# Patient Record
(demographics unavailable — no encounter records)

---

## 2024-10-09 NOTE — HISTORY OF PRESENT ILLNESS
[de-identified] : Agapito Kumar 81F, no AC/AP mechanical fall in ER 9/27/2024. CT w small L frontal tSAH, 1 cm circular R frontal convexity hyperdensity, likely mening, stable c/t 2017. No ha/n/v.  No seizures. -currently following neurology dr nunes per patient -has plastics appointment for left superior facial

## 2024-10-09 NOTE — ASSESSMENT
[FreeTextEntry1] : Agapito Kumar 81F, no AC/AP mechanical fall in ER 9/27/2024. CT w small L frontal tSAH, 1 cm circular R frontal convexity hyperdensity, likely mening, stable c/t 2017. No ha/n/v.  No seizures. -currently following neurology dr nunes per patient -has plastics appointment for left superior facial

## 2024-10-09 NOTE — PHYSICAL EXAM
[General Appearance - Alert] : alert [General Appearance - In No Acute Distress] : in no acute distress [Oriented To Time, Place, And Person] : oriented to person, place, and time [Impaired Insight] : insight and judgment were intact [Affect] : the affect was normal [Person] : oriented to person [Place] : oriented to place [Time] : oriented to time [Short Term Intact] : short term memory intact [Remote Intact] : remote memory intact [Span Intact] : the attention span was normal [Concentration Intact] : normal concentrating ability [Fluency] : fluency intact [Comprehension] : comprehension intact [Current Events] : adequate knowledge of current events [Past History] : adequate knowledge of personal past history [Vocabulary] : adequate range of vocabulary [Cranial Nerves Optic (II)] : visual acuity intact bilaterally,  pupils equal round and reactive to light [Cranial Nerves Oculomotor (III)] : extraocular motion intact [Cranial Nerves Trigeminal (V)] : facial sensation intact symmetrically [Cranial Nerves Vestibulocochlear (VIII)] : hearing was intact bilaterally [Cranial Nerves Glossopharyngeal (IX)] : tongue and palate midline [Cranial Nerves Accessory (XI - Cranial And Spinal)] : head turning and shoulder shrug symmetric [Cranial Nerves Hypoglossal (XII)] : there was no tongue deviation with protrusion [Motor Tone] : muscle tone was normal in all four extremities [Motor Strength] : muscle strength was normal in all four extremities [No Muscle Atrophy] : normal bulk in all four extremities [Sensation Tactile Decrease] : light touch was intact [Sclera] : the sclera and conjunctiva were normal [PERRL With Normal Accommodation] : pupils were equal in size, round, reactive to light, with normal accommodation [Extraocular Movements] : extraocular movements were intact [Over the Past 2 Weeks, Have You Felt Down, Depressed, or Hopeless?] : 1.) Over the past 2 weeks, have you felt down, depressed, or hopeless? No [Over the Past 2 Weeks, Have You Felt Little Interest or Pleasure Doing Things?] : 2.) Over the past 2 weeks, have you felt little interest or pleasure doing things? No [FreeTextEntry1] : -depression screening/MDD completed; PH9 score  < 5, negative MDD [FreeTextEntry5] : left superior facial; bruising

## 2024-10-30 NOTE — ASSESSMENT
[FreeTextEntry1] : This is an 81-year-old female with fibromyalgia and irritable bowel syndrome.  She also suffers from chronic GERD and history of peptic ulcer disease.  For the GERD, she is to continue on lansoprazole 15 mg daily.  I had advised her to try famotidine 20 mg at bedtime.  She wants to hold off until her wrist has healed.  She states that she will call in a couple of weeks when she can try the famotidine.  For the constipation, she is to increase her fiber and water intake.  In the past her constipation has improved by taking fiber supplements.  I spent approximately 30 minutes with her going over her history, answering questions, and discussing management of her GERD and IBS.

## 2024-10-30 NOTE — HISTORY OF PRESENT ILLNESS
[FreeTextEntry1] : Agapito for follow-up visit.  She relates that approximately 5 weeks ago she had an accidental fall, hitting her head on the floor, needing stitches on her left upper eyelid, small intracranial bleed, and breaking her wrist.  Because of this accident she has been under tremendous amount of stress.  She noticed that she was having upper abdominal pains, cramping, followed by worsening constipation.  She would have small pellet sized stools.  She has since gotten better with improvement in her stress.  The abdominal pain has disappeared and she is having good bowel movements.  The whole time she was having this issue there was no associated rectal bleeding or melena.  No nausea or vomiting.  No fevers or chills.  Review of records reveal a CT of the chest abdomen pelvis on September 27, 2024 with no obvious GI pathology.  I reviewed the results of the CAT scan of the abdomen pelvis with her.  She relates that she had recent blood work done by her rheumatologist at [facility] which came back normal.  She will send copies of the report.  During the time that she was having upper abdominal pain should increase her lansoprazole from 15 mg to 30 mg for 3 days for which she states helped her.  She is now back down to lansoprazole 15 mg daily.  She did not give a trial of the famotidine as had recommended in the past.

## 2024-11-06 NOTE — PHYSICAL EXAM
[No Acute Distress] : no acute distress [Well Nourished] : well nourished [Well Developed] : well developed [Well-Appearing] : well-appearing [Normal Voice/Communication] : normal voice/communication [Normal Sclera/Conjunctiva] : normal sclera/conjunctiva [PERRL] : pupils equal round and reactive to light [EOMI] : extraocular movements intact [Normal Outer Ear/Nose] : the outer ears and nose were normal in appearance [Normal Oropharynx] : the oropharynx was normal [Normal TMs] : both tympanic membranes were normal [No JVD] : no jugular venous distention [Supple] : supple [No Respiratory Distress] : no respiratory distress  [No Accessory Muscle Use] : no accessory muscle use [Clear to Auscultation] : lungs were clear to auscultation bilaterally [Normal Rate] : normal rate  [Regular Rhythm] : with a regular rhythm [Normal S1, S2] : normal S1 and S2 [No Carotid Bruits] : no carotid bruits [No Edema] : there was no peripheral edema [No Extremity Clubbing/Cyanosis] : no extremity clubbing/cyanosis [Declined Breast Exam] : declined breast exam  [Soft] : abdomen soft [Normal Bowel Sounds] : normal bowel sounds [Declined Rectal Exam] : declined rectal exam [No CVA Tenderness] : no CVA  tenderness [No Spinal Tenderness] : no spinal tenderness [No Focal Deficits] : no focal deficits [Normal Gait] : normal gait [Speech Grossly Normal] : speech grossly normal [Alert and Oriented x3] : oriented to person, place, and time [de-identified] : Wearing glasses [de-identified] : no ST [de-identified] : no stridor [de-identified] : R=16; no wheezing; good air entry [de-identified] : no cords [de-identified] : per GYN [de-identified] : Bilateral lower extremity vasculitic rash [de-identified] : Anxious

## 2024-11-06 NOTE — REVIEW OF SYSTEMS
[Redness] : redness [Dryness] : dryness  [Vision Problems] : vision problems [Itching] : itching [Hearing Loss] : hearing loss [Postnasal Drip] : postnasal drip [Dyspnea on Exertion] : dyspnea on exertion [Heartburn] : heartburn [Joint Pain] : joint pain [Back Pain] : back pain [Itching] : Itching [Nail Changes] : nail changes [Hair Changes] : hair changes [Skin Rash] : skin rash [Anxiety] : anxiety [Depression] : depression [Negative] : Heme/Lymph

## 2024-11-06 NOTE — PHYSICAL EXAM
[No Acute Distress] : no acute distress [Well Nourished] : well nourished [Well Developed] : well developed [Well-Appearing] : well-appearing [Normal Voice/Communication] : normal voice/communication [Normal Sclera/Conjunctiva] : normal sclera/conjunctiva [PERRL] : pupils equal round and reactive to light [EOMI] : extraocular movements intact [Normal Outer Ear/Nose] : the outer ears and nose were normal in appearance [Normal Oropharynx] : the oropharynx was normal [Normal TMs] : both tympanic membranes were normal [No JVD] : no jugular venous distention [Supple] : supple [No Respiratory Distress] : no respiratory distress  [No Accessory Muscle Use] : no accessory muscle use [Clear to Auscultation] : lungs were clear to auscultation bilaterally [Normal Rate] : normal rate  [Regular Rhythm] : with a regular rhythm [Normal S1, S2] : normal S1 and S2 [No Carotid Bruits] : no carotid bruits [No Edema] : there was no peripheral edema [No Extremity Clubbing/Cyanosis] : no extremity clubbing/cyanosis [Declined Breast Exam] : declined breast exam  [Soft] : abdomen soft [Normal Bowel Sounds] : normal bowel sounds [Declined Rectal Exam] : declined rectal exam [No CVA Tenderness] : no CVA  tenderness [No Spinal Tenderness] : no spinal tenderness [No Focal Deficits] : no focal deficits [Normal Gait] : normal gait [Speech Grossly Normal] : speech grossly normal [Alert and Oriented x3] : oriented to person, place, and time [de-identified] : Wearing glasses [de-identified] : no ST [de-identified] : no stridor [de-identified] : R=16; no wheezing; good air entry [de-identified] : no cords [de-identified] : per GYN [de-identified] : Bilateral lower extremity vasculitic rash [de-identified] : Anxious

## 2024-11-06 NOTE — PHYSICAL EXAM
[No Acute Distress] : no acute distress [Well Nourished] : well nourished [Well Developed] : well developed [Well-Appearing] : well-appearing [Normal Voice/Communication] : normal voice/communication [Normal Sclera/Conjunctiva] : normal sclera/conjunctiva [PERRL] : pupils equal round and reactive to light [EOMI] : extraocular movements intact [Normal Outer Ear/Nose] : the outer ears and nose were normal in appearance [Normal Oropharynx] : the oropharynx was normal [Normal TMs] : both tympanic membranes were normal [No JVD] : no jugular venous distention [Supple] : supple [No Respiratory Distress] : no respiratory distress  [No Accessory Muscle Use] : no accessory muscle use [Clear to Auscultation] : lungs were clear to auscultation bilaterally [Normal Rate] : normal rate  [Regular Rhythm] : with a regular rhythm [Normal S1, S2] : normal S1 and S2 [No Carotid Bruits] : no carotid bruits [No Edema] : there was no peripheral edema [No Extremity Clubbing/Cyanosis] : no extremity clubbing/cyanosis [Declined Breast Exam] : declined breast exam  [Soft] : abdomen soft [Normal Bowel Sounds] : normal bowel sounds [Declined Rectal Exam] : declined rectal exam [No CVA Tenderness] : no CVA  tenderness [No Spinal Tenderness] : no spinal tenderness [No Focal Deficits] : no focal deficits [Normal Gait] : normal gait [Speech Grossly Normal] : speech grossly normal [Alert and Oriented x3] : oriented to person, place, and time [de-identified] : Wearing glasses [de-identified] : no ST [de-identified] : no stridor [de-identified] : R=16; no wheezing; good air entry [de-identified] : no cords [de-identified] : per GYN [de-identified] : Bilateral lower extremity vasculitic rash [de-identified] : Anxious

## 2024-11-06 NOTE — HISTORY OF PRESENT ILLNESS
[FreeTextEntry8] : Comes in to update me on her recent health issues.  Also has list of questions which she would like to review and discuss with me. Reports that her left eye wound is healing well.  The sutures have been removed.  Edema and redness have diminished.  Her only concern is that she has difficulty raising her left eyebrow. Had recent labs with rheumatology copy brought in for scanning.  Wants me to review with her. Wants to review status of her vaccines to determine what vaccines are necessary. Wants a prescription for physical therapy for her cervical and lumbar spine issues. Remains under the care of neurology. Recently saw cardiology.  Upset that follow-up echo was not done.  Plans to address with cardiology at her next visit there. Found an article online which linked amlodipine with vasculitis.  Both cardiology and rheumatology have advised her to hold her amlodipine for 2 to 4 weeks to see if her vasculitic rash improves.  She is reluctant to do so presently.  Her rash tends to fluctuate.  Remains concerned that her Sjogren's can lead to lymphoma.

## 2024-11-06 NOTE — ASSESSMENT
[FreeTextEntry1] : Laceration above left eye Sutures removed Healing well with reduced edema and erythema Plastic surgery follow-up Planning to see Dr. Nevarez regarding inability to raise left eyebrow  Sjogren's syndrome Has vasculitic dermatitis Under the care of Dr. Dubose = rheumatology Recent labs reviewed with patient and scanned Continue topical therapy Dermatology follow-up  History of meningioma History of TIA Recent fall with subarachnoid hemorrhage Under the care of neurology Continue brain imaging Remains asymptomatic Fall precautions/balance training Prescriptions for physical therapy for cervical and lumbar spine disc disease given as requested  Hypertension Blood pressure in good range today Weight control and low-sodium diet On daily amlodipine Rheumatology and cardiology have advised her to hold amlodipine for 2 to 4 weeks to determine if there is improvement in her rash = patient presently declining Cardiology follow-up/echo  Asthma Clinically stable Monitor chest x-ray and PFTs Vaccines reviewed Will consider influenza, COVID, Prevnar 20 vaccination Has had recent Tdap and RSV vaccines///also considering Shingrix vaccination Consider [place] Xopenex nebs as needed  She wishes to return in follow-up at her next scheduled visit in December 2024 and as needed She will call if her status changes or worsens or for any medical/pulmonary issues and return to be seen immediately All of the above was discussed in detail with the patient Her list of questions was addressed and answered Written directions were given She verbally confirmed understanding of all of the above and agreement with the above plan

## 2024-11-06 NOTE — HEALTH RISK ASSESSMENT
[Intercurrent ED visits] : went to ED [Intercurrent hospitalizations] : was admitted to the hospital  [No] : In the past 12 months have you used drugs other than those required for medical reasons? No [Any fall with injury in past year] : Patient reported fall with injury in the past year [Patient refused screening] : Patient refused screening [Former] : Former [de-identified] : Cardiology, neurology, neurosurgery, plastic surgery, GI [de-identified] : None presently [de-identified] : Regular <<-----Click on this checkbox to enter Procedure Circumcision  2018    Active  KRISTI

## 2024-11-06 NOTE — ASSESSMENT
[FreeTextEntry1] : Laceration above left eye Sutures removed Healing well with reduced edema and erythema Plastic surgery follow-up Planning to see Dr. Nevarez regarding inability to raise left eyebrow  Sjogren's syndrome Has vasculitic dermatitis Under the care of Dr. Dubose = rheumatology Recent labs reviewed with patient and scanned Continue topical therapy Dermatology follow-up  History of meningioma History of TIA Recent fall with subarachnoid hemorrhage Under the care of neurology Continue brain imaging Remains asymptomatic Fall precautions/balance training Prescriptions for physical therapy for cervical and lumbar spine disc disease given as requested  Hypertension Blood pressure in good range today Weight control and low-sodium diet On daily amlodipine Rheumatology and cardiology have advised her to hold amlodipine for 2 to 4 weeks to determine if there is improvement in her rash = patient presently declining Cardiology follow-up/echo  Asthma Clinically stable Monitor chest x-ray and PFTs Vaccines reviewed Will consider influenza, COVID, Prevnar 20 vaccination Has had recent Tdap and RSV vaccines///also considering Shingrix vaccination Consider [place] Xopenex nebs as needed  She wishes to return in follow-up at her next scheduled visit in December 2024 and as needed She will call if her status changes or worsens or for any medical/pulmonary issues and return to be seen immediately All of the above was discussed in detail with the patient Her list of questions was addressed and answered Written directions were given She verbally confirmed understanding of all of the above and agreement with the above plan

## 2024-11-06 NOTE — ASSESSMENT
[FreeTextEntry1] : Laceration above left eye Sutures removed Healing well with reduced edema and erythema Plastic surgery follow-up Planning to see Dr. Nevarez regarding inability to raise left eyebrow  Sjogren's syndrome Has vasculitic dermatitis Under the care of Dr. Duboes = rheumatology Recent labs reviewed with patient and scanned Continue topical therapy Dermatology follow-up  History of meningioma History of TIA Recent fall with subarachnoid hemorrhage Under the care of neurology Continue brain imaging Remains asymptomatic Fall precautions/balance training Prescriptions for physical therapy for cervical and lumbar spine disc disease given as requested  Hypertension Blood pressure in good range today Weight control and low-sodium diet On daily amlodipine Rheumatology and cardiology have advised her to hold amlodipine for 2 to 4 weeks to determine if there is improvement in her rash = patient presently declining Cardiology follow-up/echo  Asthma Clinically stable Monitor chest x-ray and PFTs Vaccines reviewed Will consider influenza, COVID, Prevnar 20 vaccination Has had recent Tdap and RSV vaccines///also considering Shingrix vaccination Consider [place] Xopenex nebs as needed  She wishes to return in follow-up at her next scheduled visit in December 2024 and as needed She will call if her status changes or worsens or for any medical/pulmonary issues and return to be seen immediately All of the above was discussed in detail with the patient Her list of questions was addressed and answered Written directions were given She verbally confirmed understanding of all of the above and agreement with the above plan

## 2024-11-06 NOTE — HEALTH RISK ASSESSMENT
[Intercurrent ED visits] : went to ED [Intercurrent hospitalizations] : was admitted to the hospital  [No] : In the past 12 months have you used drugs other than those required for medical reasons? No [Any fall with injury in past year] : Patient reported fall with injury in the past year [Patient refused screening] : Patient refused screening [Former] : Former [de-identified] : Cardiology, neurology, neurosurgery, plastic surgery, GI [de-identified] : None presently [de-identified] : Regular

## 2024-11-06 NOTE — HEALTH RISK ASSESSMENT
[Intercurrent ED visits] : went to ED [Intercurrent hospitalizations] : was admitted to the hospital  [No] : In the past 12 months have you used drugs other than those required for medical reasons? No [Any fall with injury in past year] : Patient reported fall with injury in the past year [Patient refused screening] : Patient refused screening [Former] : Former [de-identified] : Cardiology, neurology, neurosurgery, plastic surgery, GI [de-identified] : None presently [de-identified] : Regular

## 2024-12-30 NOTE — PHYSICAL EXAM
[No Acute Distress] : no acute distress [Well Nourished] : well nourished [Well Developed] : well developed [Well-Appearing] : well-appearing [Normal Voice/Communication] : normal voice/communication [Normal Sclera/Conjunctiva] : normal sclera/conjunctiva [PERRL] : pupils equal round and reactive to light [EOMI] : extraocular movements intact [Normal Outer Ear/Nose] : the outer ears and nose were normal in appearance [Normal Oropharynx] : the oropharynx was normal [Normal TMs] : both tympanic membranes were normal [No JVD] : no jugular venous distention [Supple] : supple [No Respiratory Distress] : no respiratory distress  [No Accessory Muscle Use] : no accessory muscle use [Clear to Auscultation] : lungs were clear to auscultation bilaterally [Normal Rate] : normal rate  [Regular Rhythm] : with a regular rhythm [Normal S1, S2] : normal S1 and S2 [No Carotid Bruits] : no carotid bruits [No Edema] : there was no peripheral edema [No Extremity Clubbing/Cyanosis] : no extremity clubbing/cyanosis [Declined Breast Exam] : declined breast exam  [Soft] : abdomen soft [Normal Bowel Sounds] : normal bowel sounds [Declined Rectal Exam] : declined rectal exam [No CVA Tenderness] : no CVA  tenderness [No Spinal Tenderness] : no spinal tenderness [No Focal Deficits] : no focal deficits [Normal Gait] : normal gait [Speech Grossly Normal] : speech grossly normal [Alert and Oriented x3] : oriented to person, place, and time [de-identified] : Wearing glasses [de-identified] : no ST [de-identified] : no stridor [de-identified] : R=16; no wheezing; good air entry [de-identified] : no cords [de-identified] : per GYN [de-identified] : Bilateral lower extremity vasculitic rash [de-identified] : Anxious

## 2024-12-30 NOTE — HEALTH RISK ASSESSMENT
[No] : In the past 12 months have you used drugs other than those required for medical reasons? No [No falls in past year] : Patient reported no falls in the past year [Patient refused screening] : Patient refused screening [de-identified] : Exercises [de-identified] : Regular [Former] : Former

## 2024-12-30 NOTE — ASSESSMENT
[FreeTextEntry1] : Acute URI symptoms with scratchy throat, nasal congestion, wheezing, cough RVP sent today Rest/fluids Monitor fever curve Mucinex DM if needed for cough relief Gargles/lozenges/warm fluids Warm compresses to sinuses Steam therapy/humidifier Saline rinses Flonase/Atrovent nasal spray as needed  Sjogren's syndrome Has vasculitic dermatitis Under the care of Dr. Dubose = rheumatology Continue topical therapy Dermatology follow-up  History of meningioma History of TIA Recent fall with subarachnoid hemorrhage Under the care of neurology Continue brain imaging Remains asymptomatic Fall precautions/balance training  Hypertension Blood pressure in good range today Weight control and low-sodium diet On daily amlodipine Rheumatology and cardiology have advised her to hold amlodipine for 2 to 4 weeks to determine if there is improvement in her vasculitic rash = patient presently declining but will consider upon return to [place] from [place] Cardiology follow-up Prescription for repeat echo given as requested so as to monitor pulmonary hypertension  Asthma Clinically stable Monitor chest x-ray She declined PFTs today/states she will do it next visit Vaccines reviewed Will consider influenza, COVID, Prevnar 20 vaccination Has had recent Tdap and RSV vaccines///also considering Shingrix vaccination Consider [place] Xopenex nebs as needed As needed prescriptions for Medrol, Zithromax, rescue inhaler given for travel as requested  GERD GI follow-up Remains on famotidine Trying to wean off PPI therapy as per GI Antireflux diet and precautions  Osteoporosis Monitor bone density testing Planning follow-up with endocrine and rheumatology for further evaluation and management Weightbearing exercise/vitamin D supplementation/calcium rich diet/supplementation  She wishes to return in follow-up for her annual physical and as needed She will call if her status changes or worsens or for any medical/pulmonary issues and return to be seen immediately All of the above was discussed in detail with the patient Her list of questions was addressed and answered She verbally confirmed understanding of all of the above and agreement with the above plan

## 2024-12-30 NOTE — ASSESSMENT
[FreeTextEntry1] : Acute URI symptoms with scratchy throat, nasal congestion, wheezing, cough RVP sent today Rest/fluids Monitor fever curve Mucinex DM if needed for cough relief Gargles/lozenges/warm fluids Warm compresses to sinuses Steam therapy/humidifier Saline rinses Flonase/Atrovent nasal spray as needed  Sjogren's syndrome Has vasculitic dermatitis Under the care of Dr. Dubose = rheumatology Continue topical therapy Dermatology follow-up  History of meningioma History of TIA Recent fall with subarachnoid hemorrhage Under the care of neurology Continue brain imaging Remains asymptomatic Fall precautions/balance training  Hypertension Blood pressure in good range today Weight control and low-sodium diet On daily amlodipine Rheumatology and cardiology have advised her to hold amlodipine for 2 to 4 weeks to determine if there is improvement in her vasculitic rash = patient presently declining but will consider upon return to [place] from [place] Cardiology follow-up Prescription for repeat echo given as requested so as to monitor pulmonary hypertension  Asthma Clinically stable Monitor chest x-ray She declined PFTs today/states she will do it next visit Vaccines reviewed Will consider influenza, COVID, Prevnar 20 vaccination Has had recent Tdap and RSV vaccines///also considering Shingrix vaccination Consider [place] Xopenex nebs as needed As needed prescriptions for Medrol, Zithromax, rescue inhaler given for travel as requested  GERD GI follow-up Remains on famotidine Trying to wean off PPI therapy as per GI Antireflux diet and precautions  Osteoporosis Monitor bone density testing Planning follow-up with endocrine and rheumatology for further evaluation and management Weightbearing exercise/vitamin D supplementation/calcium rich diet/supplementation  She wishes to return in follow-up for her annual physical and as needed She will call if her status changes or worsens or for any medical/pulmonary issues and return to be seen immediately All of the above was discussed in detail with the patient Her list of questions was addressed and answered She verbally confirmed understanding of all of the above and agreement with the above plan

## 2024-12-30 NOTE — HEALTH RISK ASSESSMENT
[No] : In the past 12 months have you used drugs other than those required for medical reasons? No [No falls in past year] : Patient reported no falls in the past year [Patient refused screening] : Patient refused screening [de-identified] : Exercises [de-identified] : Regular [Former] : Former

## 2024-12-30 NOTE — PHYSICAL EXAM
[No Acute Distress] : no acute distress [Well Nourished] : well nourished [Well Developed] : well developed [Well-Appearing] : well-appearing [Normal Voice/Communication] : normal voice/communication [Normal Sclera/Conjunctiva] : normal sclera/conjunctiva [PERRL] : pupils equal round and reactive to light [EOMI] : extraocular movements intact [Normal Outer Ear/Nose] : the outer ears and nose were normal in appearance [Normal Oropharynx] : the oropharynx was normal [Normal TMs] : both tympanic membranes were normal [No JVD] : no jugular venous distention [Supple] : supple [No Respiratory Distress] : no respiratory distress  [No Accessory Muscle Use] : no accessory muscle use [Clear to Auscultation] : lungs were clear to auscultation bilaterally [Normal Rate] : normal rate  [Regular Rhythm] : with a regular rhythm [Normal S1, S2] : normal S1 and S2 [No Carotid Bruits] : no carotid bruits [No Edema] : there was no peripheral edema [No Extremity Clubbing/Cyanosis] : no extremity clubbing/cyanosis [Declined Breast Exam] : declined breast exam  [Soft] : abdomen soft [Normal Bowel Sounds] : normal bowel sounds [Declined Rectal Exam] : declined rectal exam [No CVA Tenderness] : no CVA  tenderness [No Spinal Tenderness] : no spinal tenderness [No Focal Deficits] : no focal deficits [Normal Gait] : normal gait [Speech Grossly Normal] : speech grossly normal [Alert and Oriented x3] : oriented to person, place, and time [de-identified] : Wearing glasses [de-identified] : no ST [de-identified] : no stridor [de-identified] : R=16; no wheezing; good air entry [de-identified] : no cords [de-identified] : per GYN [de-identified] : Bilateral lower extremity vasculitic rash [de-identified] : Anxious

## 2024-12-30 NOTE — HISTORY OF PRESENT ILLNESS
[FreeTextEntry8] : Comes in for acute medical visit.  Hoping to go to [place] next week.  Comes in today in preparation for that trip.  Reports that her fibromyalgia has been active.  Continues to have issues with mouth dryness.  Also suffering with active vasculitis.  Topical therapy only somewhat helpful in reducing her symptomatology.  Considering a discontinuation of amlodipine upon her return to [place] from [place] to determine if this medication is the source of her vasculitis.  Under the care of GI for GERD.  Would like to come off of PPI therapy and remain on famotidine therapy alone.  May be sick.  Has scratchy throat.  Has nasal congestion and rhinitis.  Has noticed some chest congestion and wheezing.  Denies any chest pain.  Denies any significant cough.  Denies fever or hemoptysis.  Has not done any COVID or viral testing.  She reports that cardiology has not ordered follow-up echo for her PAH.  Requesting that I place prescription in system for future testing.  Planning to see endocrine regarding management of osteoporosis upon return from [place].

## 2025-01-30 NOTE — PHYSICAL EXAM
[Well Nourished] : well nourished [Well Developed] : well developed [Normal] : no jugular venous distention, supple, no lymphadenopathy and the thyroid was normal and there were no nodules present [No Respiratory Distress] : no respiratory distress  [No Accessory Muscle Use] : no accessory muscle use [de-identified] : mild dyspnea  [de-identified] : mild scattereed wheeze

## 2025-01-30 NOTE — REVIEW OF SYSTEMS
[Fever] : no fever [Chills] : no chills [Fatigue] : fatigue [Hot Flashes] : no hot flashes [Night Sweats] : no night sweats [Recent Change In Weight] : ~T no recent weight change [Shortness Of Breath] : shortness of breath [Wheezing] : wheezing [Cough] : cough [Dyspnea on Exertion] : dyspnea on exertion [Joint Pain] : no joint pain [Muscle Pain] : no muscle pain [Negative] : Gastrointestinal

## 2025-01-30 NOTE — HISTORY OF PRESENT ILLNESS
[FreeTextEntry8] : 81 yr old F patient of Dr Quintana here for an acute visit. She has been diagnosed with covid, symptoms starting 8 days ago. Started feeling better and now is having shortness of breath.  Is taking robitussin for her cough.  She has history of sjogrens, pulm htn and asthma for which she occasionally takes zopenex, she did not take it this morning.  Had prevnar 20 and flu vaccines about 1 month ago.  RSV vaccine 2 years go.  No fever, body aches.   currently with flu, diagnosed the same day she was diagnosed with covid.  Just returned from [place].

## 2025-02-05 NOTE — HISTORY OF PRESENT ILLNESS
[FreeTextEntry8] : Comes in for acute visit. Recently tested positive for COVID-19.  Exposed to her spouse who had influenza.  Patient then tested positive for influenza.  Has been using lev albuterol via nebulizer 3 times daily.  Recently was seen here in the office and advised to use Medrol pack which she recently completed.  Slowly recovering.  Still has multiple complaints.  Feels weak and dizzy.  Fatigue slightly improved.  Denies any nausea/vomiting/diarrhea.  Appetite reduced.  Denies any fevers or chills.  Denies any chest pain.  Cough has improved.  Breathing more comfortably.  Occasional wheezing.  Denies any hemoptysis.  Denies any ear pain.  Complains of severe dryness of her airways.  Feels that her Sjogren's has been exacerbated.  Extremely anxious over her health situation.  Also appears concerned about her living situation and poor finances. [Spouse] : spouse

## 2025-02-05 NOTE — HEALTH RISK ASSESSMENT
[Intercurrent Urgi Care visits] : went to urgent care [No] : In the past 12 months have you used drugs other than those required for medical reasons? No [Any fall with injury in past year] : Patient reported fall with injury in the past year [Patient refused screening] : Patient refused screening [de-identified] : Dr. Fairbanks [de-identified] : None presently [de-identified] : Regular [Former] : Former

## 2025-02-05 NOTE — ASSESSMENT
[FreeTextEntry1] : Recent acute COVID-19 and influenza Likely triggering acute asthma exacerbation Slowly clinically improving Reassured that many of her symptoms are related to her acute viral illnesses and may take weeks to resolve Her questions regarding vaccination, retesting, and quarantine were again reviewed and answered Rest Activity as tolerated Keep well-hydrated Monitor fever curve Use Tylenol if needed to control fever and pain Continue cough medication Continue lev albuterol via nebulizer every 4-6 hours as needed Hold on repeat Medrol pack at this time Gargles/lozenges/warm fluids for any sore throat symptoms Apply warm compresses to sinuses Steam therapy/humidifier Saline nasal rinses Flonase as needed  Sjogren syndrome Complains of increased dryness of airways Suspect exacerbation triggered by viral processes Can discuss with rheumatology and ENT Humidifier Saline nasal rinses and gel Minimize antihistamine use Minimize Flonase use Keep well-hydrated Emollients Will check labs below today  Hypertension Blood pressure in good range today Continue amlodipine Low-sodium diet Cardiology follow-up  GERD Maintain antireflux diet and precautions Remains on PPI therapy Planning switch to famotidine as per GI Gaviscon as needed GI follow-up with Dr. Park  Osteoporosis Daily weightbearing exercise Vitamin D supplementation = monitor level Calcium rich diet/supplementation Monitor bone density Planning endocrine follow-up for management/treatment  She will return for her annual physical with PFTs in June 2025 and as needed She will call if her status does not improve or worsens/changes and return to be seen immediately She will call for any medical or pulmonary issues and return to be seen immediately All of the above was discussed in detail with the patient and her  All of her questions were addressed and answered Reassurance was provided She verbally confirmed understanding of all of the above and agreement with the above plan

## 2025-02-05 NOTE — PHYSICAL EXAM
[No Acute Distress] : no acute distress [Well Nourished] : well nourished [Well Developed] : well developed [Well-Appearing] : well-appearing [Normal Voice/Communication] : normal voice/communication [Normal Sclera/Conjunctiva] : normal sclera/conjunctiva [PERRL] : pupils equal round and reactive to light [EOMI] : extraocular movements intact [Normal Outer Ear/Nose] : the outer ears and nose were normal in appearance [Normal Oropharynx] : the oropharynx was normal [Normal TMs] : both tympanic membranes were normal [No JVD] : no jugular venous distention [Supple] : supple [No Respiratory Distress] : no respiratory distress  [No Accessory Muscle Use] : no accessory muscle use [Clear to Auscultation] : lungs were clear to auscultation bilaterally [Normal Rate] : normal rate  [Regular Rhythm] : with a regular rhythm [Normal S1, S2] : normal S1 and S2 [No Carotid Bruits] : no carotid bruits [No Edema] : there was no peripheral edema [No Extremity Clubbing/Cyanosis] : no extremity clubbing/cyanosis [Declined Breast Exam] : declined breast exam  [Soft] : abdomen soft [Normal Bowel Sounds] : normal bowel sounds [Declined Rectal Exam] : declined rectal exam [No CVA Tenderness] : no CVA  tenderness [No Spinal Tenderness] : no spinal tenderness [No Focal Deficits] : no focal deficits [Normal Gait] : normal gait [Speech Grossly Normal] : speech grossly normal [Alert and Oriented x3] : oriented to person, place, and time [de-identified] : Wearing glasses [de-identified] : no ST [de-identified] : no stridor [de-identified] : R=16; no wheezing; good air entry [de-identified] : no cords [de-identified] : per GYN [de-identified] : Bilateral lower extremity vasculitic rash [de-identified] : Anxious

## 2025-02-05 NOTE — REVIEW OF SYSTEMS
[Fatigue] : fatigue [Recent Change In Weight] : ~T recent weight change [Redness] : redness [Dryness] : dryness  [Vision Problems] : vision problems [Itching] : itching [Hearing Loss] : hearing loss [Postnasal Drip] : postnasal drip [Dyspnea on Exertion] : dyspnea on exertion [Heartburn] : heartburn [Joint Pain] : joint pain [Back Pain] : back pain [Itching] : Itching [Nail Changes] : nail changes [Hair Changes] : hair changes [Skin Rash] : skin rash [Dizziness] : dizziness [Anxiety] : anxiety [Depression] : depression [Negative] : Heme/Lymph

## 2025-02-05 NOTE — HEALTH RISK ASSESSMENT
[Intercurrent Urgi Care visits] : went to urgent care [No] : In the past 12 months have you used drugs other than those required for medical reasons? No [Any fall with injury in past year] : Patient reported fall with injury in the past year [Patient refused screening] : Patient refused screening [de-identified] : Dr. Fairbanks [de-identified] : None presently [de-identified] : Regular [Former] : Former

## 2025-02-05 NOTE — PHYSICAL EXAM
[No Acute Distress] : no acute distress [Well Nourished] : well nourished [Well Developed] : well developed [Well-Appearing] : well-appearing [Normal Voice/Communication] : normal voice/communication [Normal Sclera/Conjunctiva] : normal sclera/conjunctiva [PERRL] : pupils equal round and reactive to light [EOMI] : extraocular movements intact [Normal Outer Ear/Nose] : the outer ears and nose were normal in appearance [Normal Oropharynx] : the oropharynx was normal [Normal TMs] : both tympanic membranes were normal [No JVD] : no jugular venous distention [Supple] : supple [No Respiratory Distress] : no respiratory distress  [No Accessory Muscle Use] : no accessory muscle use [Clear to Auscultation] : lungs were clear to auscultation bilaterally [Normal Rate] : normal rate  [Regular Rhythm] : with a regular rhythm [Normal S1, S2] : normal S1 and S2 [No Carotid Bruits] : no carotid bruits [No Edema] : there was no peripheral edema [No Extremity Clubbing/Cyanosis] : no extremity clubbing/cyanosis [Declined Breast Exam] : declined breast exam  [Soft] : abdomen soft [Normal Bowel Sounds] : normal bowel sounds [Declined Rectal Exam] : declined rectal exam [No CVA Tenderness] : no CVA  tenderness [No Spinal Tenderness] : no spinal tenderness [No Focal Deficits] : no focal deficits [Normal Gait] : normal gait [Speech Grossly Normal] : speech grossly normal [Alert and Oriented x3] : oriented to person, place, and time [de-identified] : Wearing glasses [de-identified] : no ST [de-identified] : no stridor [de-identified] : R=16; no wheezing; good air entry [de-identified] : no cords [de-identified] : per GYN [de-identified] : Bilateral lower extremity vasculitic rash [de-identified] : Anxious

## 2025-04-01 NOTE — REVIEW OF SYSTEMS
[Feeling Fatigued] : feeling fatigued [Abdominal Pain] : abdominal pain [Blood in Stool] : blood in stool [Joint Pain] : joint pain [Rash] : rash [Memory Lapses Or Loss] : memory lapses or loss [Under Stress] : under stress [Negative] : Genitourinary

## 2025-04-01 NOTE — DISCUSSION/SUMMARY
[FreeTextEntry1] : Ms. Kumar has no new complaints and looks unchanged.  Her exam shows regular rhythm, normal blood pressure, no change in her weight over the past year.  Clear lungs, and a normal cardiac exam.  An EKG shows left atrial enlargement which has been seen in the past and was otherwise normal.  I am a little concerned about her increasing estimated PAS.  This is most likely due to HFpEF, but she has a history of scleroderma and so she could have pulmonary hypertension from a collagen vascular basis.  This would require a course right heart catheterization to differentiate.  I explained this to her.  Her echo will be repeated in 6 months.  If her estimated PA pressure increases any more I we will plan to schedule her for right heart cath. [EKG obtained to assist in diagnosis and management of assessed problem(s)] : EKG obtained to assist in diagnosis and management of assessed problem(s)

## 2025-04-01 NOTE — HISTORY OF PRESENT ILLNESS
[FreeTextEntry1] : 81-year-old female with a history of hypertension, palpitations, scleroderma pulmonary hypertension, chronic anxiety.  She was last seen 8/24.  She feels about the same.  She reports she is lost a lot of weight.  She was in [place] over the winter.  She had an echo done last week just prior to her visit.  It showed an increase in her estimated PAS from 40 to 51.   She decided not to stop taking her small dose of amlodipine although she is worried it might be contributing to her collagen vascular disease.

## 2025-04-08 NOTE — ASSESSMENT
[FreeTextEntry1] : History of pulmonary hypertension Felt secondary to chronic lung disease Recent echo with progressive pulmonary hypertension ?  Worsening in setting of progressive Sjogren's syndrome Cardiology follow-up Monitor echo Cardiology has advised patient to consider right heart catheterization I have advised her that although invasive and with risks this procedure would help to more accurately quantify her degree of pulmonary hypertension and assist in treatment and management of her disease = she wishes to consider Planning consultation with Dr. Solis or Dr. Roque in our pulmonary hypertension program  Sjogren syndrome In addition to vasculitic skin rash now having more systemic symptoms She is on topical therapy which has only been somewhat helpful in controlling her skin symptoms Suspect that she will require systemic therapy to control her systemic symptoms Did have good response while on Medrol pack May need low-dose prednisone Seeing Dr. Dubose = rheumatology next week and she plans to discuss with him Also going for rheumatology second opinion with Dr. Ch at [facility]  Asthma/COPD Has been very resistant to using daily controller medicine Only wishes to use albuterol as needed PFTs are poor/she is symptomatic She is very hesitant about using sprays given her oral symptoms/mouth dryness which Sjogren's Advised to consider using Pulmicort = budesonide low-dose via nebulizer twice daily Can also use either albuterol or long-acting beta-2 agonist twice daily via nebulizer She wishes to consider and will get back to me with her decision  She wishes to return in follow-up at her next scheduled visit and as needed She states that she will keep in close contact with me via phone She will call if her status does not improve/worsens/changes or for any medical or pulmonary issues and return to be seen immediately All of the above was discussed in detail with her and all questions were addressed and answered She verbally confirmed understanding of all of the above and agreement with the above plan

## 2025-04-08 NOTE — HEALTH RISK ASSESSMENT
[No] : In the past 12 months have you used drugs other than those required for medical reasons? No [No falls in past year] : Patient reported no falls in the past year [Patient refused screening] : Patient refused screening [Former] : Former [de-identified] : Cardiology [de-identified] : Exercises [de-identified] : Regular

## 2025-04-08 NOTE — HEALTH RISK ASSESSMENT
[No] : In the past 12 months have you used drugs other than those required for medical reasons? No [No falls in past year] : Patient reported no falls in the past year [Patient refused screening] : Patient refused screening [Former] : Former [de-identified] : Cardiology [de-identified] : Exercises [de-identified] : Regular

## 2025-04-08 NOTE — PHYSICAL EXAM
[No Acute Distress] : no acute distress [Well Nourished] : well nourished [Well Developed] : well developed [Well-Appearing] : well-appearing [Normal Voice/Communication] : normal voice/communication [Normal Sclera/Conjunctiva] : normal sclera/conjunctiva [Normal Outer Ear/Nose] : the outer ears and nose were normal in appearance [No JVD] : no jugular venous distention [Supple] : supple [No Respiratory Distress] : no respiratory distress  [No Accessory Muscle Use] : no accessory muscle use [Normal Rate] : normal rate  [Regular Rhythm] : with a regular rhythm [No Edema] : there was no peripheral edema [No Extremity Clubbing/Cyanosis] : no extremity clubbing/cyanosis [Declined Breast Exam] : declined breast exam  [Declined Rectal Exam] : declined rectal exam [No Focal Deficits] : no focal deficits [Normal Gait] : normal gait [Speech Grossly Normal] : speech grossly normal [Alert and Oriented x3] : oriented to person, place, and time [de-identified] : Wearing glasses [de-identified] : no stridor [de-identified] : R=16 [de-identified] : per GYN [de-identified] : Bilateral lower extremity vasculitic rash [de-identified] : Anxious

## 2025-04-08 NOTE — PHYSICAL EXAM
[No Acute Distress] : no acute distress [Well Nourished] : well nourished [Well Developed] : well developed [Well-Appearing] : well-appearing [Normal Voice/Communication] : normal voice/communication [Normal Sclera/Conjunctiva] : normal sclera/conjunctiva [Normal Outer Ear/Nose] : the outer ears and nose were normal in appearance [No JVD] : no jugular venous distention [Supple] : supple [No Respiratory Distress] : no respiratory distress  [No Accessory Muscle Use] : no accessory muscle use [Normal Rate] : normal rate  [Regular Rhythm] : with a regular rhythm [No Edema] : there was no peripheral edema [No Extremity Clubbing/Cyanosis] : no extremity clubbing/cyanosis [Declined Breast Exam] : declined breast exam  [Declined Rectal Exam] : declined rectal exam [No Focal Deficits] : no focal deficits [Normal Gait] : normal gait [Speech Grossly Normal] : speech grossly normal [Alert and Oriented x3] : oriented to person, place, and time [de-identified] : Wearing glasses [de-identified] : no stridor [de-identified] : R=16 [de-identified] : per GYN [de-identified] : Bilateral lower extremity vasculitic rash [de-identified] : Anxious

## 2025-04-08 NOTE — ASSESSMENT
[FreeTextEntry1] : History of pulmonary hypertension Felt secondary to chronic lung disease Recent echo with progressive pulmonary hypertension ?  Worsening in setting of progressive Sjogren's syndrome Cardiology follow-up Monitor echo Cardiology has advised patient to consider right heart catheterization I have advised her that although invasive and with risks this procedure would help to more accurately quantify her degree of pulmonary hypertension and assist in treatment and management of her disease = she wishes to consider Planning consultation with Dr. Solis or Dr. Roque in our pulmonary hypertension program  Sjogren syndrome In addition to vasculitic skin rash now having more systemic symptoms She is on topical therapy which has only been somewhat helpful in controlling her skin symptoms Suspect that she will require systemic therapy to control her systemic symptoms Did have good response while on Medrol pack May need low-dose prednisone Seeing Dr. Dubose = rheumatology next week and she plans to discuss with him Also going for rheumatology second opinion with Dr. Ch at [facility]  Asthma/COPD Has been very resistant to using daily controller medicine Only wishes to use albuterol as needed PFTs are poor/she is symptomatic She is very hesitant about using sprays given her oral symptoms/mouth dryness which Sjogren's Advised to consider using Pulmicort = budesonide low-dose via nebulizer twice daily Can also use either albuterol or long-acting beta-2 agonist twice daily via nebulizer She wishes to consider and will get back to me with her decision  She wishes to return in follow-up at her next scheduled visit and as needed She states that she will keep in close contact with me via phone She will call if her status does not improve/worsens/changes or for any medical or pulmonary issues and return to be seen immediately All of the above was discussed in detail with her and all questions were addressed and answered She verbally confirmed understanding of all of the above and agreement with the above plan

## 2025-04-08 NOTE — HISTORY OF PRESENT ILLNESS
[Spouse] : spouse [FreeTextEntry8] : Comes in for acute medical visit.  Wants to update me on her status.  Reports that she recently saw cardiology and had follow-up echo.  Pulmonary hypertension has worsened.  Right heart catheterization advised.  Patient undecided.  Has follow-up with cardiology with follow-up echo in 6 months.  Feels that her Sjogren's disease has become progressive.  In addition to topical skin changes/vasculitis, now having multiple systemic complaints.  Thinks that she may need increased therapy.  Also feels that topical therapy not completely effective.  Reports that she felt much improved when on steroid therapy for management of asthma with recent URIs.  Has appointment with rheumatology next week.  Continues to have issues with poorly controlled asthma.  Now thinks that she wants to use daily controller medicine for better asthma control.

## 2025-04-24 NOTE — PHYSICAL EXAM
[No Acute Distress] : no acute distress [Well Nourished] : well nourished [Well Developed] : well developed [Well-Appearing] : well-appearing [Normal Voice/Communication] : normal voice/communication [Normal Sclera/Conjunctiva] : normal sclera/conjunctiva [PERRL] : pupils equal round and reactive to light [EOMI] : extraocular movements intact [Normal Outer Ear/Nose] : the outer ears and nose were normal in appearance [Normal Oropharynx] : the oropharynx was normal [Normal TMs] : both tympanic membranes were normal [No JVD] : no jugular venous distention [Supple] : supple [No Respiratory Distress] : no respiratory distress  [No Accessory Muscle Use] : no accessory muscle use [Clear to Auscultation] : lungs were clear to auscultation bilaterally [Normal Rate] : normal rate  [Regular Rhythm] : with a regular rhythm [Normal S1, S2] : normal S1 and S2 [No Carotid Bruits] : no carotid bruits [No Edema] : there was no peripheral edema [No Extremity Clubbing/Cyanosis] : no extremity clubbing/cyanosis [Declined Breast Exam] : declined breast exam  [Soft] : abdomen soft [Normal Bowel Sounds] : normal bowel sounds [Declined Rectal Exam] : declined rectal exam [No CVA Tenderness] : no CVA  tenderness [No Spinal Tenderness] : no spinal tenderness [No Focal Deficits] : no focal deficits [Normal Gait] : normal gait [Speech Grossly Normal] : speech grossly normal [Alert and Oriented x3] : oriented to person, place, and time [de-identified] : Wearing glasses [de-identified] : No sinus tenderness [de-identified] : no stridor [de-identified] : R=16; good air entry; no cough noted; no wheezing noted [de-identified] : Normal bilateral radial pulses; no cords [de-identified] : per GYN [de-identified] : Bilateral lower extremity vasculitic rash [de-identified] : Anxious

## 2025-04-24 NOTE — PHYSICAL EXAM
[No Acute Distress] : no acute distress [Well Nourished] : well nourished [Well Developed] : well developed [Well-Appearing] : well-appearing [Normal Voice/Communication] : normal voice/communication [Normal Sclera/Conjunctiva] : normal sclera/conjunctiva [PERRL] : pupils equal round and reactive to light [EOMI] : extraocular movements intact [Normal Outer Ear/Nose] : the outer ears and nose were normal in appearance [Normal Oropharynx] : the oropharynx was normal [Normal TMs] : both tympanic membranes were normal [No JVD] : no jugular venous distention [Supple] : supple [No Respiratory Distress] : no respiratory distress  [No Accessory Muscle Use] : no accessory muscle use [Clear to Auscultation] : lungs were clear to auscultation bilaterally [Normal Rate] : normal rate  [Regular Rhythm] : with a regular rhythm [Normal S1, S2] : normal S1 and S2 [No Carotid Bruits] : no carotid bruits [No Edema] : there was no peripheral edema [No Extremity Clubbing/Cyanosis] : no extremity clubbing/cyanosis [Declined Breast Exam] : declined breast exam  [Soft] : abdomen soft [Normal Bowel Sounds] : normal bowel sounds [Declined Rectal Exam] : declined rectal exam [No CVA Tenderness] : no CVA  tenderness [No Spinal Tenderness] : no spinal tenderness [No Focal Deficits] : no focal deficits [Normal Gait] : normal gait [Speech Grossly Normal] : speech grossly normal [Alert and Oriented x3] : oriented to person, place, and time [de-identified] : Wearing glasses [de-identified] : No sinus tenderness [de-identified] : no stridor [de-identified] : R=16; good air entry; no cough noted; no wheezing noted [de-identified] : Normal bilateral radial pulses; no cords [de-identified] : per GYN [de-identified] : Bilateral lower extremity vasculitic rash [de-identified] : Anxious

## 2025-04-24 NOTE — ASSESSMENT
[FreeTextEntry1] : Complains of sinus/nasal congestion and ear congestion Feels chest congested and has wheezing ?  Related to acute URI ?  Related to seasonal allergies Triggering asthma RVP sent Rest Fluids Monitor fever curve Can use benzonatate as needed for cough relief Hold antibiotics for now Again encouraged use of budesonide nebs twice daily Can use lev albuterol nebulizer therapy 3 times daily and as needed Will hold oral steroids presently Gargles/lozenges/warm fluids Minimize pollen exposure Keep windows closed/use AC Saline nasal rinses Flonase twice daily with daily antihistamine Does not tolerate Singulair Allergy follow-up Carry Xopenex MDI for as needed use  Hypertension Blood pressure in good range today Low-sodium diet Exercise/weight control Continue amlodipine  Sjogren syndrome with skin vasculitic changes Follow-up with dermatology Follow-up with rheumatology Planning rheumatology second opinion at \[facility]\"" Continue topical therapy May need systemic therapy Will be seeing Dr. Solis for evaluation and management of pulmonary hypertension Cardiology follow-up/monitor echo  She will return in follow-up for her next scheduled visit with PFTs and as needed She will call if her status does not improve/worsens/changes or for any medical or pulmonary issues and return to be seen immediately All of the above was discussed with her in detail and all questions were addressed and answered Reassurance was provided She verbally confirmed understanding of all of the above and agreement with the above plan

## 2025-04-24 NOTE — HISTORY OF PRESENT ILLNESS
[FreeTextEntry8] : Comes in for acute medical visit.  Not feeling well.  Complains of nasal congestion, sinus congestion, ear congestion.  Denies sore throat.  Denies any chest pain.  Denies any significant cough.  Denies any hemoptysis or fevers.  Thought that her chest was congested earlier today.  Took a nebulizer treatment this morning with relief of her symptoms.  Wants her lungs checked.  Has set up appointment with pulmonary hypertension team for May 2025.  Has appointment with rheum at [facility] next week.  Advised records sent.  Continues to have usual complaints related to CV disease including rash, GI distress, fibromyalgia pain, dryness.

## 2025-04-24 NOTE — HISTORY OF PRESENT ILLNESS
[FreeTextEntry8] : Comes in for acute medical visit.  Not feeling well.  Complains of nasal congestion, sinus congestion, ear congestion.  Denies sore throat.  Denies any chest pain.  Denies any significant cough.  Denies any hemoptysis or fevers.  Thought that her chest was congested earlier today.  Took a nebulizer treatment this morning with relief of her symptoms.  Wants her lungs checked.  Has set up appointment with pulmonary hypertension team for May 2025.  Has appointment with rheum at [facility] next week.  Advised records sent.  Continues to have usual complaints related to CV disease including rash, GI distress, fibromyalgia pain, dryness.

## 2025-04-24 NOTE — ASSESSMENT
[FreeTextEntry1] : Complains of sinus/nasal congestion and ear congestion Feels chest congested and has wheezing ?  Related to acute URI ?  Related to seasonal allergies Triggering asthma RVP sent Rest Fluids Monitor fever curve Can use benzonatate as needed for cough relief Hold antibiotics for now Again encouraged use of budesonide nebs twice daily Can use lev albuterol nebulizer therapy 3 times daily and as needed Will hold oral steroids presently Gargles/lozenges/warm fluids Minimize pollen exposure Keep windows closed/use AC Saline nasal rinses Flonase twice daily with daily antihistamine Does not tolerate Singulair Allergy follow-up Carry Xopenex MDI for as needed use  Hypertension Blood pressure in good range today Low-sodium diet Exercise/weight control Continue amlodipine  Sjogren syndrome with skin vasculitic changes Follow-up with dermatology Follow-up with rheumatology Planning rheumatology second opinion at [facility] Continue topical therapy May need systemic therapy Will be seeing Dr. Solis for evaluation and management of pulmonary hypertension Cardiology follow-up/monitor echo  She will return in follow-up for her next scheduled visit with PFTs and as needed She will call if her status does not improve/worsens/changes or for any medical or pulmonary issues and return to be seen immediately All of the above was discussed with her in detail and all questions were addressed and answered Reassurance was provided She verbally confirmed understanding of all of the above and agreement with the above plan

## 2025-04-24 NOTE — HEALTH RISK ASSESSMENT
[No] : In the past 12 months have you used drugs other than those required for medical reasons? No [No falls in past year] : Patient reported no falls in the past year [Patient refused screening] : Patient refused screening [Former] : Former [de-identified] : Rheumatology [de-identified] : Some exercise [de-identified] : Regular

## 2025-04-24 NOTE — HEALTH RISK ASSESSMENT
[No] : In the past 12 months have you used drugs other than those required for medical reasons? No [No falls in past year] : Patient reported no falls in the past year [Patient refused screening] : Patient refused screening [Former] : Former [de-identified] : Rheumatology [de-identified] : Some exercise [de-identified] : Regular

## 2025-04-24 NOTE — REVIEW OF SYSTEMS
[Fatigue] : fatigue [Redness] : redness [Dryness] : dryness  [Vision Problems] : vision problems [Itching] : itching [Earache] : earache [Hearing Loss] : hearing loss [Postnasal Drip] : postnasal drip [Lower Ext Edema] : lower extremity edema [Wheezing] : wheezing [Dyspnea on Exertion] : dyspnea on exertion [Abdominal Pain] : abdominal pain [Heartburn] : heartburn [Joint Pain] : joint pain [Back Pain] : back pain [Itching] : Itching [Nail Changes] : nail changes [Hair Changes] : hair changes [Skin Rash] : skin rash [Dizziness] : dizziness [Anxiety] : anxiety [Depression] : depression [Negative] : Heme/Lymph

## 2025-04-28 NOTE — PHYSICAL EXAM
[Alert] : alert [Normal Voice/Communication] : normal voice/communication [Healthy Appearing] : healthy appearing [No Acute Distress] : no acute distress [Sclera] : the sclera and conjunctiva were normal [Hearing Threshold Finger Rub Not [place]] : hearing was normal [Normal Lips/Gums] : the lips and gums were normal [Oropharynx] : the oropharynx was normal [Normal Appearance] : the appearance of the neck was normal [No Neck Mass] : no neck mass was observed [No Respiratory Distress] : no respiratory distress [No Acc Muscle Use] : no accessory muscle use [Respiration, Rhythm And Depth] : normal respiratory rhythm and effort [Auscultation Breath Sounds / Voice Sounds] : lungs were clear to auscultation bilaterally [Heart Rate And Rhythm] : heart rate was normal and rhythm regular [Normal S1, S2] : normal S1 and S2 [Murmurs] : no murmurs [Bowel Sounds] : normal bowel sounds [Abdomen Tenderness] : non-tender [No Masses] : no abdominal mass palpated [Abdomen Soft] : soft [] : no hepatosplenomegaly [Oriented To Time, Place, And Person] : oriented to person, place, and time

## 2025-04-28 NOTE — HISTORY OF PRESENT ILLNESS
[FreeTextEntry1] : Agapito presents for follow-up visit.  She relates that for the past 1 month she has been able to come off lansoprazole 15 mg and switch over to famotidine 20 mg twice a day.  She feels that she is having more heartburn symptoms on the famotidine.  She feels that she is having more intermittent symptoms of dysphagia on the famotidine.  She feels that since stopping the lansoprazole she is having more IBS symptoms with bloating and constipation.  However she is also being treated for Sjogren's and pulmonary hypertension.  She feels that she has upper abdominal pain, initially left upper quadrant now radiating to the right upper quadrant.  However her abdominal pain seems to occur after she exercises.  As far as the bowel movement, she reports daily bowel movements but seems to be more effort.  She needs to sit in the toilet for about an hour to produce bowel movements.  She increases her dietary fiber with some improvement.  She felt that while she was on lansoprazole for GERD, she was having better bowel movements.  She then goes on to say that she has osteoporosis that is not being treated medically.  She is aware that proton pump inhibitors can cause osteoporosis.  She does not want to go back on lansoprazole for this reason.

## 2025-04-28 NOTE — ASSESSMENT
[FreeTextEntry1] : This is a 81-year-old female with a history of chronic GERD and irritable bowel syndrome, mostly constipation predominant.  For the GERD, I recommend continuing the famotidine 20 mg twice a day.  We discussed lansoprazole 15 mg since it helped her in the past.  However she is reluctant to go back on lansoprazole due to possible long-term complications of osteoporosis.  She reports that she has osteoporosis that is not being treated.  For the IBS constipation predominant, I recommend Metamucil 2 capsules daily.  She states that there are times where she would have diarrhea.  I explained to her that Metamucil is a soluble fiber that will increase moisture in her stool.  This helps with alternating constipation and diarrhea.  I will see her back in the office in 6 months to 9 months time for follow-up visit.  She prefers follow-up visits every 6 months.

## 2025-05-29 NOTE — DISCUSSION/SUMMARY
[FreeTextEntry1] : ---Assessment plan----------The patient has been referred here for further opinion regarding pulmonary problem,  81 year old female with history of COPD/Asthma, leukocytoclastic vasculitis, HTN presents to Dr. Solis's office for evaluation of pulmonary HTN suspicion. She follows with Dr. Quintana for her pulmonary issues. Recently her echo ordered by Cardiology (Dr. Rose) showed an increase of PASP to 51.   1) Elevated pulmonary pressures on ECHO PASP 51. ----No evidence of any RV failure Asymptomatic at this time. Reports good exercise tolerance ------ we did discuss the role of right heart catheterization but patient is not sure if she wants to proceed-------- she discussed it with Dr. Quintana and her cardiologist----------- - Follow up with cardiology for repeat echo in 6 months   2) COPD / Asthma - currently on levalbuterol neb PRN. spirometry in clinic with reduced FVC and FEV1 but effort was poor.  Stable 5 mm left apical nodule. - follow up with Dr. Quintana.   3) leukoclastic vasculitis rash (right leg skin bx in 3/204. As per patient, Rheumatology feels this is a case of mild Sjogren's and / or drug induced vasculitis (amlodipine).  CT without findings of associated ILD  - Follow up with rheumatology and dermatology, currently on topical steroids.   RTC 6 months  Thanks for allowing  me to participate  in the care of this patient.  Patient at this time  will follow  the above mentioned recommendations and return back for follow up visit. If you have any questions  I can be reached  at # [phone] (office). Jony Solis MD, [facility]  Pulmonary, Critical Care and Sleep Medicine

## 2025-05-29 NOTE — REVIEW OF SYSTEMS
[Fatigue] : fatigue [Dry Eyes] : dry eyes [Frequency] : frequency [Rash] : rash [Fever] : no fever [Chills] : no chills [Epistaxis] : no epistaxis [Sore Throat] : no sore throat [Cough] : no cough [Hemoptysis] : no hemoptysis [Sputum] : no sputum [Dyspnea] : no dyspnea [A.M. Dry Mouth] : no a.m. dry mouth [SOB on Exertion] : no sob on exertion [Chest Discomfort] : no chest discomfort [GERD] : no gerd [Abdominal Pain] : no abdominal pain [Diarrhea] : no diarrhea [Constipation] : no constipation [Raynaud] : no raynaud [Headache] : no headache [Dizziness] : no dizziness [Depression] : no depression [Diabetes] : no diabetes

## 2025-05-29 NOTE — PHYSICAL EXAM
[No Acute Distress] : no acute distress [Normal Oropharynx] : normal oropharynx [II] : Mallampati Class: II [Normal Appearance] : normal appearance [No Neck Mass] : no neck mass [Normal Rate/Rhythm] : normal rate/rhythm [Normal S1, S2] : normal s1, s2 [No Murmurs] : no murmurs [No Resp Distress] : no resp distress [Clear to Auscultation Bilaterally] : clear to auscultation bilaterally [No Abnormalities] : no abnormalities [Benign] : benign [Normal Gait] : normal gait [No Clubbing] : no clubbing [No Cyanosis] : no cyanosis [FROM] : FROM [Normal Color/ Pigmentation] : normal color/ pigmentation [No Focal Deficits] : no focal deficits [Oriented x3] : oriented x3 [Normal Affect] : normal affect [TextBox_68] : lungs clear to auscultations bl

## 2025-05-29 NOTE — HISTORY OF PRESENT ILLNESS
[Former] : former [TextBox_4] : This letter  is regarding your patient  who  attended pulmonary out patient office today.  I have reviewed  patient's  past history, social history, family history and medication list. I also  reviewed nurse practitioners/ and fellows  notes and assessment and agree with it.   The patient was referred by   81 year old female with history of COPD/Asthma, leukocytoclastic vasculitis, HTN presents to Dr. Solis's office for evaluation of pulmonary HTN suspicion. She follows with Dr. Quintana for her pulmonary issues. Recently her echo ordered by Cardiology (Dr. Rose) showed an increase of PASP to 51. Currently without any significant respiratory symptoms. Exercises daily on a standing bicycle + daily walks. Reports good exercise tolerance. Able to walk up 1-2 flights without issue. She follows with [facility] after rash biopsy revealed leukoclastic vasculitis (right leg skin bx in 3/204. As per patient, Rheumatology feels this is a case of mild Sjorgen's and / or drug induced vasculitis (amlodipine).  ------No history of , fever, chills , rigors, chest pain, or hemoptysis. Questionable history of Raynaud's phenomenon. No h/o significant weight loss in last few months. No history of liver dysfunction , collagen vascular disorder or chronic thromboembolic disease. I would classify the patient's dyspnea as WHO  FUNCTIONAL CLASS II--------  Social: Worked as a teacher's instructor.   ----Echo  date--3/2025 1. 1. Concentric remodeling of LV with normal LV systolic function and grade I diastolic dysfunction. 2. Normal LA size. 3. Sclerotic aortic valve with mild AI. 4. Mitral annular calcfiication with sclerotic leaflets and mild MR. 5. Normal right sided studies. Mild/moderate TR with PA systolic pressure estimated at 51 (mild/moderate pulmonary hypertension). 6. Since 1/23 estimated PA systolic pressure from 40 to 51.----  ----Pft date---2023 severe obstruction, poor effort and reproducibility ------ ----Ct scan date-----2024 Stable 5 mm left apical nodule. No new nodules.--  5/29/25 - Initial evaluation of copd with   elevated pulmonary pressures seen on recent Echo. Referred by Dr. Quintana. Currently taking levalbuterol nebulized approximately 1-2 times a month due to history of COPD/asthma. Last PFTs in 2023 showed severe obstruction however effort and reproducibility was poor. No other respiratory complaints at this time.

## 2025-05-29 NOTE — END OF VISIT
[FreeTextEntry3] :   I, Dr. Jony Solis  personally performed the evaluation and management (E/M) services for this established patient who presents today with (a) new problem(s)/exacerbation of (an) existing condition(s). That E/M includes conducting the clinically appropriate interval history &/or exam, assessing all new/exacerbated conditions, and establishing a new plan of care. Today, my JIE, Kajal Mcdowell NP, , was here to observe my evaluation and management service for this new problem/exacerbated condition and follow the plan of care established by me going forward. [Time Spent: ___ minutes] : I have spent [unfilled] minutes of time on the encounter which excludes teaching and separately reported services.

## 2025-05-29 NOTE — DISCUSSION/SUMMARY
[FreeTextEntry1] : ---Assessment plan----------The patient has been referred here for further opinion regarding pulmonary problem,  81 year old female with history of COPD/Asthma, leukocytoclastic vasculitis, HTN presents to Dr. Solis's office for evaluation of pulmonary HTN suspicion. She follows with Dr. Quintana for her pulmonary issues. Recently her echo ordered by Cardiology (Dr. Rose) showed an increase of PASP to 51.   1) Elevated pulmonary pressures on ECHO PASP 51. ----No evidence of any RV failure Asymptomatic at this time. Reports good exercise tolerance ------ we did discuss the role of right heart catheterization but patient is not sure if she wants to proceed-------- she discussed it with Dr. Quintana and her cardiologist----------- - Follow up with cardiology for repeat echo in 6 months   2) COPD / Asthma - currently on levalbuterol neb PRN. spirometry in clinic with reduced FVC and FEV1 but effort was poor.  Stable 5 mm left apical nodule. - follow up with Dr. Quintana.   3) leukoclastic vasculitis rash (right leg skin bx in 3/204. As per patient, Rheumatology feels this is a case of mild Sjogren's and / or drug induced vasculitis (amlodipine).  CT without findings of associated ILD  - Follow up with rheumatology and dermatology, currently on topical steroids.   RTC 6 months  Thanks for allowing  me to participate  in the care of this patient.  Patient at this time  will follow  the above mentioned recommendations and return back for follow up visit. If you have any questions  I can be reached  at # [phone] (office). Jony Solis MD, [facility]  Pulmonary, Critical Care and Sleep Medicine

## 2025-05-29 NOTE — HISTORY OF PRESENT ILLNESS
[Former] : former [TextBox_4] : This letter  is regarding your patient  who  attended pulmonary out patient office today.  I have reviewed  patient's  past history, social history, family history and medication list. I also  reviewed nurse practitioners/ and fellows  notes and assessment and agree with it.   The patient was referred by   81 year old female with history of COPD/Asthma, leukocytoclastic vasculitis, HTN presents to Dr. Solis's office for evaluation of pulmonary HTN suspicion. She follows with Dr. Quintana for her pulmonary issues. Recently her echo ordered by Cardiology (Dr. Rose) showed an increase of PASP to 51. Currently without any significant respiratory symptoms. Exercises daily on a standing bicycle + daily walks. Reports good exercise tolerance. Able to walk up 1-2 flights without issue. She follows with [facility] Rheumatol[facility] after rash biopsy revealed leukoclastic vasculitis (right leg skin bx in 3/204. As per patient, Rheumatology feels this is a case of mild Sjorgen's and / or drug induced vasculitis (amlodipine).  ------No history of , fever, chills , rigors, chest pain, or hemoptysis. Questionable history of Raynaud's phenomenon. No h/o significant weight loss in last few months. No history of liver dysfunction , collagen vascular disorder or chronic thromboembolic disease. I would classify the patient's dyspnea as WHO  FUNCTIONAL CLASS II--------  Social: Worked as a teacher's instructor.   ----Echo  date--3/2025 1. 1. Concentric remodeling of LV with normal LV systolic function and grade I diastolic dysfunction. 2. Normal LA size. 3. Sclerotic aortic valve with mild AI. 4. Mitral annular calcfiication with sclerotic leaflets and mild MR. 5. Normal right sided studies. Mild/moderate TR with PA systolic pressure estimated at 51 (mild/moderate pulmonary hypertension). 6. Since 1/23 estimated PA systolic pressure from 40 to 51.----  ----Pft date---2023 severe obstruction, poor effort and reproducibility ------ ----Ct scan date-----2024 Stable 5 mm left apical nodule. No new nodules.--  5/29/25 - Initial evaluation of copd with   elevated pulmonary pressures seen on recent Echo. Referred by Dr. Quintana. Currently taking levalbuterol nebulized approximately 1-2 times a month due to history of COPD/asthma. Last PFTs in 2023 showed severe obstruction however effort and reproducibility was poor. No other respiratory complaints at this time.

## 2025-05-29 NOTE — PHYSICAL EXAM
[Normal Oropharynx] : normal oropharynx [No Acute Distress] : no acute distress [II] : Mallampati Class: II [Normal Appearance] : normal appearance [No Neck Mass] : no neck mass [Normal Rate/Rhythm] : normal rate/rhythm [Normal S1, S2] : normal s1, s2 [No Murmurs] : no murmurs [No Resp Distress] : no resp distress [Clear to Auscultation Bilaterally] : clear to auscultation bilaterally [No Abnormalities] : no abnormalities [Benign] : benign [Normal Gait] : normal gait [No Clubbing] : no clubbing [No Cyanosis] : no cyanosis [FROM] : FROM [Normal Color/ Pigmentation] : normal color/ pigmentation [No Focal Deficits] : no focal deficits [Oriented x3] : oriented x3 [Normal Affect] : normal affect [TextBox_68] : lungs clear to auscultations bl

## 2025-07-01 NOTE — HEALTH RISK ASSESSMENT
[Good] : ~his/her~  mood as  good [Intercurrent ED visits] : went to ED [Intercurrent Urgi Care visits] : went to urgent care [No] : In the past 12 months have you used drugs other than those required for medical reasons? No [0] : 1) Little interest or pleasure doing things: Not at all (0) [PHQ-2 Negative - No further assessment needed] : PHQ-2 Negative - No further assessment needed [PHQ-9 Negative - No further assessment needed] : PHQ-9 Negative - No further assessment needed [Former] : Former [10-14] : 10-14 [> 15 Years] : > 15 Years [NO] : No [HIV test declined] : HIV test declined [Hepatitis C test declined] : Hepatitis C test declined [With Family] : lives with family [# of Members in Household ___] :  household currently consist of [unfilled] member(s) [Retired] : retired [Graduate School] : graduate school [] :  [# Of Children ___] : has [unfilled] children [Feels Safe at Home] : Feels safe at home [Fully functional (bathing, dressing, toileting, transferring, walking, feeding)] : Fully functional (bathing, dressing, toileting, transferring, walking, feeding) [Fully functional (using the telephone, shopping, preparing meals, housekeeping, doing laundry, using] : Fully functional and needs no help or supervision to perform IADLs (using the telephone, shopping, preparing meals, housekeeping, doing laundry, using transportation, managing medications and managing finances) [Smoke Detector] : smoke detector [Carbon Monoxide Detector] : carbon monoxide detector [Seat Belt] :  uses seat belt [Sunscreen] : uses sunscreen [Caregiver Concerns] : has caregiver concerns [With Patient/Caregiver] : , with patient/caregiver [Designated Healthcare Proxy] : Designated healthcare proxy [Name: ___] : Health Care Proxy's Name: [unfilled]  [Relationship: ___] : Relationship: [unfilled] [Any fall with injury in past year] : Patient reported fall with injury in the past year [Little interest or pleasure doing things] : 1) Little interest or pleasure doing things [Feeling down, depressed, or hopeless] : 2) Feeling down, depressed, or hopeless [1] : 2) Feeling down, depressed, or hopeless for several days (1) [Yes] : Reviewed medication list for presence of high-risk medications. [Opioids] : opioids [Patient declined mammogram] : Patient declined mammogram [Patient declined PAP Smear] : Patient declined PAP Smear [Patient declined bone density test] : Patient declined bone density test [Patient declined colonoscopy] : Patient declined colonoscopy

## 2025-07-01 NOTE — CURRENT MEDS
[Side Effects] :  side effects [Yes] : Reviewed medication list for presence of high-risk medications. [Benzodiazepines] : benzodiazepines [Opioids] : opioids

## 2025-07-01 NOTE — PHYSICAL EXAM
[No Acute Distress] : no acute distress [Well Nourished] : well nourished [Well Developed] : well developed [Well-Appearing] : well-appearing [Normal Voice/Communication] : normal voice/communication [Normal Sclera/Conjunctiva] : normal sclera/conjunctiva [PERRL] : pupils equal round and reactive to light [EOMI] : extraocular movements intact [Normal Outer Ear/Nose] : the outer ears and nose were normal in appearance [Normal Oropharynx] : the oropharynx was normal [Normal TMs] : both tympanic membranes were normal [No JVD] : no jugular venous distention [Supple] : supple [No Lymphadenopathy] : no lymphadenopathy [No Respiratory Distress] : no respiratory distress  [Clear to Auscultation] : lungs were clear to auscultation bilaterally [No Accessory Muscle Use] : no accessory muscle use [Normal Rate] : normal rate  [Regular Rhythm] : with a regular rhythm [Normal S1, S2] : normal S1 and S2 [No Carotid Bruits] : no carotid bruits [Pedal Pulses Present] : the pedal pulses are present [No Edema] : there was no peripheral edema [No Extremity Clubbing/Cyanosis] : no extremity clubbing/cyanosis [No Nipple Discharge] : no nipple discharge [No Axillary Lymphadenopathy] : no axillary lymphadenopathy [Soft] : abdomen soft [Non Tender] : non-tender [Non-distended] : non-distended [No Masses] : no abdominal mass palpated [No HSM] : no HSM [Normal Bowel Sounds] : normal bowel sounds [Declined Rectal Exam] : declined rectal exam [Normal Supraclavicular Nodes] : no supraclavicular lymphadenopathy [Normal Axillary Nodes] : no axillary lymphadenopathy [Normal Posterior Cervical Nodes] : no posterior cervical lymphadenopathy [Normal Anterior Cervical Nodes] : no anterior cervical lymphadenopathy [No CVA Tenderness] : no CVA  tenderness [No Spinal Tenderness] : no spinal tenderness [Grossly Normal Strength/Tone] : grossly normal strength/tone [Normal Gait] : normal gait [Coordination Grossly Intact] : coordination grossly intact [No Focal Deficits] : no focal deficits [Speech Grossly Normal] : speech grossly normal [Normal Affect] : the affect was normal [Alert and Oriented x3] : oriented to person, place, and time

## 2025-07-23 NOTE — HISTORY OF PRESENT ILLNESS
[FreeTextEntry1] : 80 yo female with history of fibromyalgia, irritable bowel, asthma, newly diagnosed vasculitis and Sjorgens, here for followup.  Pt reports she was dx with Sjogren's and is followed by Rheumatology Dr. Nails, [facility]. She saw Derm, Dr. Benitez, for vasculitis s/p biopsy. On topical Clobetasol on for 2 weeks off for 1 week.  Pt reports she had a fall during the fall season, had a left wrist fx, and stitches over forehead area. CT scan 9/2024- revealed left frontal subarachnoid hemorrhage. Pt was evaluated by neurosurgery- reportedly pt  remained clinically stable and is being followed by neurosurgery with serial CTs. Pt f/u with Dr. Luo.  More recently, pt was evaluated by cardiology, echo was done, PAS 51, Dr. Rose recommended repeat echo and possible right cardiac cath if PAS increases.  Continues to take 2.5mg of Amlodipine. Pt lost ~4-5lb since last visit. Pt reports eating out more frequently than before.

## 2025-07-23 NOTE — ASSESSMENT
[FreeTextEntry1] : 80 yo female with fibromyalgia and anxiety and asthma with hypertension history of hyponatremia, Sjogren's, vasculitis HTN: continue amlodipine 2.5mg Hyponatremia: stable Na Keep fluid restricted to 1-1.5 liters.  Would avoid thiazide forever since that could worsen hyponatremia Avoid NSAID use Valium prn per PMD.   All questions were answered Followup in annually.

## 2025-07-23 NOTE — HISTORY OF PRESENT ILLNESS
[FreeTextEntry1] : 80 yo female with history of fibromyalgia, irritable bowel, asthma, newly diagnosed vasculitis and Sjorgens, here for followup.  Pt reports she was dx with Sjogren's and is followed by Rheumatology Dr. Nails, [facility]. She saw Derm, Dr. Benitez, for vasculitis s/p biopsy. On topical Clobetasol on for 2 weeks off for 1 week.  Pt reports she had a fall during the fall season, had a left wrist fx, and stitches over forehead area. CT scan 9/2024- revealed left frontal subarachnoid hemorrhage. Pt was evaluated by neurosurgery- reportedly pt  remained clinically stable and is being followed by neurosurgery with serial CTs. Pt f/u with Dr. Luo.  More recently, pt was evaluated by cardiology, echo was done, PAS 51, Dr. Rose recommended repeat echo and possible right cardiac cath if PAS increases.  Continues to take 2.5mg of Amlodipine. Pt lost ~4-5lb since last visit. Pt reports eating out more frequently than before.

## 2025-07-23 NOTE — PHYSICAL EXAM
[General Appearance - Alert] : alert [Sclera] : the sclera and conjunctiva were normal [Outer Ear] : the ears and nose were normal in appearance [Neck Appearance] : the appearance of the neck was normal [] : no respiratory distress [Respiration, Rhythm And Depth] : normal respiratory rhythm and effort [Auscultation Breath Sounds / Voice Sounds] : lungs were clear to auscultation bilaterally [Heart Rate And Rhythm] : heart rate was normal and rhythm regular [Heart Sounds] : normal S1 and S2 [Heart Sounds Pericardial Friction Rub] : no pericardial rub [Bowel Sounds] : normal bowel sounds [Abdomen Soft] : soft [Abdomen Tenderness] : non-tender [Abnormal Walk] : normal gait [Skin Color & Pigmentation] : normal skin color and pigmentation [No Focal Deficits] : no focal deficits [Oriented To Time, Place, And Person] : oriented to person, place, and time [FreeTextEntry1] : ankle area vasculitis